# Patient Record
(demographics unavailable — no encounter records)

---

## 2024-10-16 NOTE — REVIEW OF SYSTEMS
[Limb Pain] : limb pain [Limb Swelling] : limb swelling [As Noted in HPI] : as noted in HPI [Negative] : Heme/Lymph [Fever] : no fever [Chills] : no chills [Chest Pain] : no chest pain [Shortness Of Breath] : no shortness of breath

## 2024-10-16 NOTE — HISTORY OF PRESENT ILLNESS
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 Patient was moving 2 weeks ago and was trying to fix something in his house, when he climbed on a chair and fell from it resulting in injury to his right anterior shin. He developed right lower leg hematoma that was "a size of an egg". He didn't seek a medical care until 5 days after the accident. He states that his hematoma is subsiding after he has been applying ice and it is less painful. He also has been experiencing right lower leg skin discoloration even prior to his DVTs and he would like to know if there is a treatment for it. He has been experiencing RLE edema, denies CP, SOB, claudication. Patient is also trying to lose weight and would like to know if there are contraindication for him to start taking Ozempic or other similar medications from a vascular standpoint.

## 2024-10-16 NOTE — ADDENDUM
[FreeTextEntry1] : This note was written by Sheila MORALES, acting as a scribe for Dr. Spencer Bruce.  I, Dr. Spencer Bruce, have read and attest that all the information, medical decision-making, and discharge instructions within are true and accurate.  I, Dr. Spencer Bruce, personally performed the evaluation and management (E/M) services for this new patient.  That E/M includes conducting the initial examination, assessing all conditions, and establishing the plan of care.  Today, my ACP, Sheila MORALES, was here to observe my evaluation and management services for this patient to be followed going forward.

## 2024-10-16 NOTE — ASSESSMENT
[FreeTextEntry1] : 49yoM, with a remote history of unprovoked LEs DVTs, on lifelong AC with Eliquis (seen by a hematologist) presents for initial evaluation due to a traumatic RLE hematoma. He has been experiencing RLE edema, denies CP, SOB, claudication. On exam, both legs are well perfused, mild RLE edema noted. Right lower leg with healed scab over mid shin, mild stasis dermatitis over lower leg/ankle and residual ecchymosis over distal toes noted. Palpable peripheral pulses throughout. RLE venous doppler was done in the office that demonstrated no acute DVT, CFV patent, atrophic FV in prox. thigh, popliteal vein reflux, no GSV/SSV reflux. 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 We discussed the findings and explained that no additional vascular intervention is needed. We recommended to wear thigh high 20-30 mmHg compression stockings to control his leg edema. F/u with his hematologist regarding the duration of AC. There is no contraindication to taking weight loss medications from a vascular standpoint. He may f/u as needed.  I spent a total of 40 minutes in this encounter.

## 2024-10-16 NOTE — PROCEDURE
[FreeTextEntry1] : RLE venous doppler was done in the office that demonstrated no acute DVT, CFV patent, atrophic FV in prox. thigh, popliteal vein reflux, no GSV/SSV reflux. Gross varicosities, measuring 3.5mm at calf

## 2024-10-16 NOTE — PHYSICAL EXAM
[Respiratory Effort] : normal respiratory effort [Normal Heart Sounds] : normal heart sounds [2+] : left 2+ [Ankle Swelling (On Exam)] : present [] : of the right leg [Ankle Swelling On The Right] : mild [Alert] : alert [Calm] : calm [Varicose Veins Of Lower Extremities] : not present [Abdomen Tenderness] : ~T ~M No abdominal tenderness [de-identified] : WN/WD, NAD [de-identified] : NC/AT [de-identified] : supple [de-identified] : FROM [de-identified] : RLE with healed scab over mid shin, mild stasis dermatitis over lower leg/ankle and residual ecchymosis over distal toes noted

## 2024-10-16 NOTE — PHYSICAL EXAM
[Respiratory Effort] : normal respiratory effort [Normal Heart Sounds] : normal heart sounds [2+] : left 2+ [Ankle Swelling (On Exam)] : present [] : of the right leg [Ankle Swelling On The Right] : mild [Alert] : alert [Calm] : calm [Varicose Veins Of Lower Extremities] : not present [Abdomen Tenderness] : ~T ~M No abdominal tenderness [de-identified] : WN/WD, NAD [de-identified] : NC/AT [de-identified] : supple [de-identified] : FROM [de-identified] : RLE with healed scab over mid shin, mild stasis dermatitis over lower leg/ankle and residual ecchymosis over distal toes noted